# Patient Record
Sex: FEMALE | Race: NATIVE HAWAIIAN OR OTHER PACIFIC ISLANDER | URBAN - METROPOLITAN AREA
[De-identification: names, ages, dates, MRNs, and addresses within clinical notes are randomized per-mention and may not be internally consistent; named-entity substitution may affect disease eponyms.]

---

## 2024-09-20 ENCOUNTER — OFFICE VISIT (OUTPATIENT)
Dept: FAMILY MEDICINE CLINIC | Facility: CLINIC | Age: 44
End: 2024-09-20
Payer: COMMERCIAL

## 2024-09-20 VITALS
RESPIRATION RATE: 13 BRPM | OXYGEN SATURATION: 97 % | BODY MASS INDEX: 49.29 KG/M2 | WEIGHT: 278.2 LBS | HEART RATE: 95 BPM | HEIGHT: 63 IN | SYSTOLIC BLOOD PRESSURE: 164 MMHG | DIASTOLIC BLOOD PRESSURE: 110 MMHG | TEMPERATURE: 98.4 F

## 2024-09-20 DIAGNOSIS — G43.E09 CHRONIC MIGRAINE WITH AURA WITHOUT STATUS MIGRAINOSUS, NOT INTRACTABLE: ICD-10-CM

## 2024-09-20 DIAGNOSIS — M43.06 LUMBAR SPONDYLOLYSIS: ICD-10-CM

## 2024-09-20 DIAGNOSIS — M54.42 CHRONIC BILATERAL LOW BACK PAIN WITH LEFT-SIDED SCIATICA: ICD-10-CM

## 2024-09-20 DIAGNOSIS — I10 ESSENTIAL (PRIMARY) HYPERTENSION: ICD-10-CM

## 2024-09-20 DIAGNOSIS — E11.69 TYPE 2 DIABETES MELLITUS WITH OTHER SPECIFIED COMPLICATION, WITHOUT LONG-TERM CURRENT USE OF INSULIN (HCC): Primary | ICD-10-CM

## 2024-09-20 DIAGNOSIS — G89.29 CHRONIC BILATERAL LOW BACK PAIN WITH LEFT-SIDED SCIATICA: ICD-10-CM

## 2024-09-20 DIAGNOSIS — Z12.31 ENCOUNTER FOR SCREENING MAMMOGRAM FOR BREAST CANCER: ICD-10-CM

## 2024-09-20 LAB — HBA1C MFR BLD: 8.5 %

## 2024-09-20 PROCEDURE — 83036 HEMOGLOBIN GLYCOSYLATED A1C: CPT | Performed by: STUDENT IN AN ORGANIZED HEALTH CARE EDUCATION/TRAINING PROGRAM

## 2024-09-20 PROCEDURE — 99214 OFFICE O/P EST MOD 30 MIN: CPT | Performed by: STUDENT IN AN ORGANIZED HEALTH CARE EDUCATION/TRAINING PROGRAM

## 2024-09-20 PROCEDURE — 3077F SYST BP >= 140 MM HG: CPT | Performed by: STUDENT IN AN ORGANIZED HEALTH CARE EDUCATION/TRAINING PROGRAM

## 2024-09-20 PROCEDURE — 3080F DIAST BP >= 90 MM HG: CPT | Performed by: STUDENT IN AN ORGANIZED HEALTH CARE EDUCATION/TRAINING PROGRAM

## 2024-09-20 RX ORDER — LOSARTAN POTASSIUM AND HYDROCHLOROTHIAZIDE 12.5; 1 MG/1; MG/1
1 TABLET ORAL DAILY
Qty: 90 TABLET | Refills: 3 | Status: SHIPPED | OUTPATIENT
Start: 2024-09-20

## 2024-09-20 RX ORDER — LOSARTAN POTASSIUM 100 MG/1
100 TABLET ORAL DAILY
Qty: 90 TABLET | Refills: 1 | Status: SHIPPED | OUTPATIENT
Start: 2024-09-20 | End: 2024-09-20 | Stop reason: ALTCHOICE

## 2024-09-20 RX ORDER — GABAPENTIN 600 MG/1
600 TABLET ORAL 2 TIMES DAILY
Qty: 180 TABLET | Refills: 1 | Status: SHIPPED | OUTPATIENT
Start: 2024-09-20 | End: 2025-03-19

## 2024-09-20 RX ORDER — LOSARTAN POTASSIUM AND HYDROCHLOROTHIAZIDE 12.5; 1 MG/1; MG/1
1 TABLET ORAL DAILY
Qty: 30 TABLET | Refills: 0 | Status: SHIPPED | OUTPATIENT
Start: 2024-09-20 | End: 2024-09-20 | Stop reason: SDUPTHER

## 2024-09-20 RX ORDER — TOPIRAMATE 50 MG/1
50 TABLET, FILM COATED ORAL 2 TIMES DAILY
Qty: 180 TABLET | Refills: 3 | Status: SHIPPED | OUTPATIENT
Start: 2024-09-20

## 2024-09-20 RX ORDER — GLIPIZIDE 5 MG/1
5 TABLET ORAL
COMMUNITY
Start: 2024-08-22 | End: 2024-09-20 | Stop reason: ALTCHOICE

## 2024-09-20 SDOH — ECONOMIC STABILITY: INCOME INSECURITY: HOW HARD IS IT FOR YOU TO PAY FOR THE VERY BASICS LIKE FOOD, HOUSING, MEDICAL CARE, AND HEATING?: NOT HARD AT ALL

## 2024-09-20 SDOH — ECONOMIC STABILITY: FOOD INSECURITY: WITHIN THE PAST 12 MONTHS, YOU WORRIED THAT YOUR FOOD WOULD RUN OUT BEFORE YOU GOT MONEY TO BUY MORE.: NEVER TRUE

## 2024-09-20 SDOH — ECONOMIC STABILITY: FOOD INSECURITY: WITHIN THE PAST 12 MONTHS, THE FOOD YOU BOUGHT JUST DIDN'T LAST AND YOU DIDN'T HAVE MONEY TO GET MORE.: NEVER TRUE

## 2024-09-20 ASSESSMENT — PATIENT HEALTH QUESTIONNAIRE - PHQ9
SUM OF ALL RESPONSES TO PHQ QUESTIONS 1-9: 0
SUM OF ALL RESPONSES TO PHQ QUESTIONS 1-9: 0
2. FEELING DOWN, DEPRESSED OR HOPELESS: NOT AT ALL
SUM OF ALL RESPONSES TO PHQ QUESTIONS 1-9: 0
SUM OF ALL RESPONSES TO PHQ9 QUESTIONS 1 & 2: 0
SUM OF ALL RESPONSES TO PHQ QUESTIONS 1-9: 0
1. LITTLE INTEREST OR PLEASURE IN DOING THINGS: NOT AT ALL

## 2024-09-20 ASSESSMENT — ENCOUNTER SYMPTOMS
RHINORRHEA: 0
NAUSEA: 0
ABDOMINAL PAIN: 0
SORE THROAT: 0
EYE PAIN: 0
DIARRHEA: 0
CONSTIPATION: 0
VOMITING: 0
CHEST TIGHTNESS: 0
BACK PAIN: 1
COUGH: 0
SHORTNESS OF BREATH: 0

## 2024-09-21 LAB
A/G RATIO: 1.1 RATIO (ref 1.1–2.6)
ALBUMIN: 2.9 G/DL (ref 3.5–5)
ALP BLD-CCNC: 105 U/L (ref 25–115)
ALT SERPL-CCNC: 8 U/L (ref 5–40)
ANION GAP SERPL CALCULATED.3IONS-SCNC: 8 MMOL/L (ref 3–15)
AST SERPL-CCNC: 10 U/L (ref 10–37)
BASOPHILS ABSOLUTE: 0 K/UL (ref 0–0.2)
BASOPHILS RELATIVE PERCENT: 0 % (ref 0–2)
BILIRUB SERPL-MCNC: 0.3 MG/DL (ref 0.2–1.2)
BUN BLDV-MCNC: 12 MG/DL (ref 6–22)
CALCIUM SERPL-MCNC: 8.4 MG/DL (ref 8.4–10.5)
CHLORIDE BLD-SCNC: 110 MMOL/L (ref 98–110)
CHOLESTEROL, TOTAL: 166 MG/DL (ref 110–200)
CHOLESTEROL/HDL RATIO: 2.9 (ref 0–5)
CO2: 28 MMOL/L (ref 20–32)
CREAT SERPL-MCNC: 1.2 MG/DL (ref 0.5–1.2)
CREATININE URINE: 38 MG/DL
CREATININE URINE: 38 MG/DL
EOSINOPHIL # BLD: 2 % (ref 0–6)
EOSINOPHILS ABSOLUTE: 0.2 K/UL (ref 0–0.5)
GFR, ESTIMATED: 55.1 ML/MIN/1.73 SQ.M.
GLOBULIN: 2.7 G/DL (ref 2–4)
GLUCOSE: 146 MG/DL (ref 70–99)
HCT VFR BLD CALC: 29.8 % (ref 35.1–46.5)
HDLC SERPL-MCNC: 57 MG/DL
HEMOGLOBIN: 8.8 G/DL (ref 11.7–15.5)
LDL CHOLESTEROL: 88 MG/DL (ref 50–99)
LDL/HDL RATIO: 1.5
LYMPHOCYTES # BLD: 18 % (ref 20–45)
LYMPHOCYTES ABSOLUTE: 1.7 K/UL (ref 1–4.8)
MCH RBC QN AUTO: 25 PG (ref 26–34)
MCHC RBC AUTO-ENTMCNC: 30 G/DL (ref 31–36)
MCV RBC AUTO: 85 FL (ref 80–99)
MICROALB/CREAT RATIO (UG/MG CREAT.): 8341.6 (ref 0–30)
MICROALBUMIN/CREAT 24H UR: 3169.8 MG/L (ref 0.1–17)
MONOCYTES ABSOLUTE: 0.6 K/UL (ref 0.1–1)
MONOCYTES: 6 % (ref 3–12)
NEUTROPHILS ABSOLUTE: 7 K/UL (ref 1.8–7.7)
NEUTROPHILS: 73 % (ref 40–75)
NON-HDL CHOLESTEROL: 109 MG/DL
PDW BLD-RTO: 14.9 % (ref 10–15.5)
PLATELET # BLD: 239 K/UL (ref 140–440)
PMV BLD AUTO: 10.4 FL (ref 9–13)
POTASSIUM SERPL-SCNC: 3.9 MMOL/L (ref 3.5–5.5)
RBC # BLD: 3.52 M/UL (ref 3.8–5.2)
SODIUM BLD-SCNC: 146 MMOL/L (ref 133–145)
TOTAL PROTEIN: 5.6 G/DL (ref 6.4–8.3)
TRIGL SERPL-MCNC: 105 MG/DL (ref 40–149)
VLDLC SERPL CALC-MCNC: 21 MG/DL (ref 8–30)
WBC # BLD: 9.5 K/UL (ref 4–11)

## 2024-09-24 RX ORDER — DAPAGLIFLOZIN 10 MG/1
10 TABLET, FILM COATED ORAL DAILY
Qty: 30 TABLET | OUTPATIENT
Start: 2024-09-24

## 2024-09-24 NOTE — TELEPHONE ENCOUNTER
Pt called requesting a refill to be sent to the [facility] on file.    Requested Prescriptions     Pending Prescriptions Disp Refills    dapagliflozin (FARXIGA) 10 MG tablet 30 tablet      Sig: Take 1 tablet by mouth daily

## 2024-10-16 ENCOUNTER — HOSPITAL ENCOUNTER (OUTPATIENT)
Facility: HOSPITAL | Age: 44
Setting detail: SPECIMEN
Discharge: HOME | End: 2024-10-19

## 2024-10-16 ENCOUNTER — OFFICE VISIT (OUTPATIENT)
Dept: FAMILY MEDICINE CLINIC | Facility: CLINIC | Age: 44
End: 2024-10-16
Payer: COMMERCIAL

## 2024-10-16 VITALS
HEART RATE: 84 BPM | TEMPERATURE: 98 F | SYSTOLIC BLOOD PRESSURE: 138 MMHG | OXYGEN SATURATION: 99 % | RESPIRATION RATE: 14 BRPM | DIASTOLIC BLOOD PRESSURE: 84 MMHG | HEIGHT: 63 IN | WEIGHT: 255 LBS | BODY MASS INDEX: 45.18 KG/M2

## 2024-10-16 DIAGNOSIS — N28.9 ABNORMAL KIDNEY FUNCTION: ICD-10-CM

## 2024-10-16 DIAGNOSIS — E11.22 TYPE 2 DIABETES MELLITUS WITH STAGE 3A CHRONIC KIDNEY DISEASE, WITHOUT LONG-TERM CURRENT USE OF INSULIN (HCC): ICD-10-CM

## 2024-10-16 DIAGNOSIS — N91.2 AMENORRHEA: Primary | ICD-10-CM

## 2024-10-16 DIAGNOSIS — Z12.11 ENCOUNTER FOR SCREENING FOR MALIGNANT NEOPLASM OF COLON: ICD-10-CM

## 2024-10-16 DIAGNOSIS — R53.83 OTHER FATIGUE: ICD-10-CM

## 2024-10-16 DIAGNOSIS — I10 PRIMARY HYPERTENSION: ICD-10-CM

## 2024-10-16 DIAGNOSIS — N18.31 TYPE 2 DIABETES MELLITUS WITH STAGE 3A CHRONIC KIDNEY DISEASE, WITHOUT LONG-TERM CURRENT USE OF INSULIN (HCC): ICD-10-CM

## 2024-10-16 DIAGNOSIS — D64.9 ANEMIA, UNSPECIFIED TYPE: ICD-10-CM

## 2024-10-16 LAB
HCG QUALITATIVE, POC: NORMAL
HCG, PREGNANCY, URINE, POC: NEGATIVE
SENTARA SPECIMEN COLLECTION: NORMAL
VALID INTERNAL CONTROL, POC: YES

## 2024-10-16 PROCEDURE — 99001 SPECIMEN HANDLING PT-LAB: CPT

## 2024-10-16 PROCEDURE — 3075F SYST BP GE 130 - 139MM HG: CPT | Performed by: STUDENT IN AN ORGANIZED HEALTH CARE EDUCATION/TRAINING PROGRAM

## 2024-10-16 PROCEDURE — 84703 CHORIONIC GONADOTROPIN ASSAY: CPT | Performed by: STUDENT IN AN ORGANIZED HEALTH CARE EDUCATION/TRAINING PROGRAM

## 2024-10-16 PROCEDURE — 99214 OFFICE O/P EST MOD 30 MIN: CPT | Performed by: STUDENT IN AN ORGANIZED HEALTH CARE EDUCATION/TRAINING PROGRAM

## 2024-10-16 PROCEDURE — 3079F DIAST BP 80-89 MM HG: CPT | Performed by: STUDENT IN AN ORGANIZED HEALTH CARE EDUCATION/TRAINING PROGRAM

## 2024-10-16 RX ORDER — BUPROPION HYDROCHLORIDE 150 MG/1
150 TABLET ORAL EVERY MORNING
Qty: 30 TABLET | Refills: 3 | Status: SHIPPED | OUTPATIENT
Start: 2024-10-16

## 2024-10-16 RX ORDER — DAPAGLIFLOZIN 10 MG/1
10 TABLET, FILM COATED ORAL EVERY MORNING
Qty: 90 TABLET | Refills: 3 | Status: SHIPPED | OUTPATIENT
Start: 2024-10-16

## 2024-10-16 RX ORDER — FERROUS SULFATE 325(65) MG
325 TABLET ORAL
COMMUNITY

## 2024-10-16 ASSESSMENT — ENCOUNTER SYMPTOMS
SORE THROAT: 0
NAUSEA: 0
EYE PAIN: 0
COUGH: 0
CHEST TIGHTNESS: 0
VOMITING: 0
BACK PAIN: 0
DIARRHEA: 0
RHINORRHEA: 0
CONSTIPATION: 0
ABDOMINAL PAIN: 0
SHORTNESS OF BREATH: 0

## 2024-10-16 NOTE — PROGRESS NOTES
Mimi Langford is a 44 y.o. female (: 1980) presenting to address:    Chief Complaint   Patient presents with    Dysmenorrhea     Feeling fatigued, at home pregnancy test negative in August       Vitals:    10/16/24 1116   BP: (!) 152/84   Pulse: 84   Resp: 14   Temp: 98 °F (36.7 °C)   SpO2: 99%       \"Have you been to the ER, urgent care clinic since your last visit?  Hospitalized since your last visit?\"    NO    “Have you seen or consulted any other health care providers outside of [facility] since your last visit?”    NO    “Have you had a colorectal cancer screening such as a colonoscopy/FIT/Cologuard?    NO    No colonoscopy on file  No cologuard on file  No FIT/FOBT on file   No flexible sigmoidoscopy on file         “Have you had a pap smear?”    NO    Date of last Cervical Cancer screen (HPV or PAP): 7/15/2014     Pt requests flu vaccine.      
lab workup will be conducted to assess her iron levels, FSH, and other hormone levels. A referral for a sleep study will be made. Wellbutrin will be prescribed to be taken once in the morning. If her iron levels remain low, an infusion of iron will be considered.    2. Anemia.  She reports dizziness and fainting spells, which could be related to her anemia. She has been taking over-the-counter iron pills as advised by her niece. Her iron levels will be checked today. If her iron levels remain low, an infusion of iron will be considered.    3. Decreased Kidney Function.  Her kidney function is slightly decreased, which could be contributing to her fatigue and anemia. This is likely due to her elevated blood sugar and blood pressure. Farxiga will be initiated to help manage her kidney function and blood sugar levels.    4. Hypertension.  Her blood pressure remains slightly elevated at 150/80 today. She has been monitoring her blood pressure at home, which has been in the 120s to 130s. She will continue her current blood pressure medication regimen.    5. Health Maintenance.  A colonoscopy is recommended due to her anemia and for colon cancer screening. A referral will be made for this procedure.    Follow-up  Return in 1 month for follow up.    Plan of care has been discussed, patient agrees with plan; all questions answered.   More than 50% of the time spent in this visit was counseling the patient about  illness and treatment options       Efrain Piedra, DO  Family Medicine  [facility]       PLEASE NOTE:   This document has been produced using voice recognition software. Unrecognized errors in transcription may be present.

## 2024-10-17 DIAGNOSIS — N17.9 ACUTE KIDNEY INJURY (HCC): Primary | ICD-10-CM

## 2024-10-17 LAB
ANION GAP SERPL CALCULATED.3IONS-SCNC: 10 MMOL/L (ref 3–15)
BASOPHILS ABSOLUTE: 0.1 K/UL (ref 0–0.2)
BASOPHILS RELATIVE PERCENT: 1 % (ref 0–2)
BUN BLDV-MCNC: 25 MG/DL (ref 6–22)
CALCIUM SERPL-MCNC: 8.9 MG/DL (ref 8.4–10.5)
CHLORIDE BLD-SCNC: 111 MMOL/L (ref 98–110)
CO2: 22 MMOL/L (ref 20–32)
CREAT SERPL-MCNC: 1.6 MG/DL (ref 0.5–1.2)
EOSINOPHIL # BLD: 3 % (ref 0–6)
EOSINOPHILS ABSOLUTE: 0.3 K/UL (ref 0–0.5)
FERRITIN: 108 NG/ML (ref 10–291)
FOLATE: 12 NG/ML
FOLLICLE STIMULATING HORMONE: 7.2 MIU/ML
GFR, ESTIMATED: 39.5 ML/MIN/1.73 SQ.M.
GLUCOSE: 104 MG/DL (ref 70–99)
HCT VFR BLD CALC: 34.5 % (ref 35.1–48)
HEMOGLOBIN: 10 G/DL (ref 11.7–16)
IRON % SATURATION: 16 % (ref 20–50)
IRON: 37 MCG/DL (ref 30–160)
LUTEINIZING HORMONE: 14 MIU/ML
LYMPHOCYTES # BLD: 22 % (ref 20–45)
LYMPHOCYTES ABSOLUTE: 1.6 K/UL (ref 1–4.8)
MCH RBC QN AUTO: 26 PG (ref 26–34)
MCHC RBC AUTO-ENTMCNC: 29 G/DL (ref 31–36)
MCV RBC AUTO: 91 FL (ref 80–99)
MONOCYTES ABSOLUTE: 0.3 K/UL (ref 0.1–1)
MONOCYTES: 5 % (ref 3–12)
NEUTROPHILS ABSOLUTE: 5.3 K/UL (ref 1.8–7.7)
NEUTROPHILS: 70 % (ref 40–75)
PDW BLD-RTO: 15.2 % (ref 10–15.5)
PLATELET # BLD: 259 K/UL (ref 140–440)
PMV BLD AUTO: 12.1 FL (ref 9–13)
POTASSIUM SERPL-SCNC: 5.6 MMOL/L (ref 3.5–5.5)
RBC # BLD: 3.8 M/UL (ref 3.8–5.2)
SODIUM BLD-SCNC: 143 MMOL/L (ref 133–145)
TOTAL IRON BINDING CAPACITY: 231 MCG/DL (ref 228–428)
TSH SERPL DL<=0.05 MIU/L-ACNC: 1.48 MCU/ML (ref 0.27–4.2)
UIBC: 194 MCG/DL (ref 110–370)
VITAMIN B-12: 322 PG/ML (ref 211–911)
WBC # BLD: 7.6 K/UL (ref 4–11)

## 2024-10-18 ENCOUNTER — TELEPHONE (OUTPATIENT)
Dept: FAMILY MEDICINE CLINIC | Facility: CLINIC | Age: 44
End: 2024-10-18

## 2024-10-18 NOTE — TELEPHONE ENCOUNTER
Discussed lab results w/pt on 10/17/24; pt called today for clarification of results; spoke w/PCP and questions were answered.

## 2024-10-18 NOTE — TELEPHONE ENCOUNTER
Pt called and wanted to discuss lab results.  Pt is wanting to discuss taking the ozempic and if that is resulting in her kidney lab readings.  Pt also stated if she is at work, there is no problem leaving a voicemail.

## 2024-10-23 ENCOUNTER — HOSPITAL ENCOUNTER (OUTPATIENT)
Facility: HOSPITAL | Age: 44
Setting detail: SPECIMEN
Discharge: HOME | End: 2024-10-26

## 2024-10-23 PROCEDURE — 99001 SPECIMEN HANDLING PT-LAB: CPT

## 2024-10-24 LAB
ANION GAP SERPL CALCULATED.3IONS-SCNC: 11 MMOL/L (ref 3–15)
BUN BLDV-MCNC: 22 MG/DL (ref 6–22)
CALCIUM SERPL-MCNC: 9.4 MG/DL (ref 8.4–10.5)
CHLORIDE BLD-SCNC: 108 MMOL/L (ref 98–110)
CO2: 23 MMOL/L (ref 20–32)
CREAT SERPL-MCNC: 1.9 MG/DL (ref 0.5–1.2)
GFR, ESTIMATED: 32.4 ML/MIN/1.73 SQ.M.
GLUCOSE: 125 MG/DL (ref 70–99)
POTASSIUM SERPL-SCNC: 5.2 MMOL/L (ref 3.5–5.5)
SENTARA SPECIMEN COLLECTION: NORMAL
SODIUM BLD-SCNC: 142 MMOL/L (ref 133–145)

## 2024-10-25 DIAGNOSIS — N17.9 AKI (ACUTE KIDNEY INJURY) (HCC): ICD-10-CM

## 2024-10-25 DIAGNOSIS — I10 PRIMARY HYPERTENSION: Primary | ICD-10-CM

## 2024-10-25 RX ORDER — AMLODIPINE BESYLATE 10 MG/1
10 TABLET ORAL DAILY
Qty: 90 TABLET | Refills: 0 | Status: SHIPPED | OUTPATIENT
Start: 2024-10-25

## 2024-11-13 ENCOUNTER — HOSPITAL ENCOUNTER (OUTPATIENT)
Facility: HOSPITAL | Age: 44
Setting detail: SPECIMEN
Discharge: HOME | End: 2024-11-16

## 2024-11-13 ENCOUNTER — OFFICE VISIT (OUTPATIENT)
Dept: FAMILY MEDICINE CLINIC | Facility: CLINIC | Age: 44
End: 2024-11-13
Payer: COMMERCIAL

## 2024-11-13 VITALS
RESPIRATION RATE: 13 BRPM | SYSTOLIC BLOOD PRESSURE: 140 MMHG | TEMPERATURE: 98.4 F | BODY MASS INDEX: 44.58 KG/M2 | HEART RATE: 90 BPM | HEIGHT: 63 IN | DIASTOLIC BLOOD PRESSURE: 80 MMHG | WEIGHT: 251.6 LBS | OXYGEN SATURATION: 99 %

## 2024-11-13 DIAGNOSIS — N18.31 TYPE 2 DIABETES MELLITUS WITH STAGE 3A CHRONIC KIDNEY DISEASE, WITHOUT LONG-TERM CURRENT USE OF INSULIN (HCC): Primary | ICD-10-CM

## 2024-11-13 DIAGNOSIS — N17.9 AKI (ACUTE KIDNEY INJURY) (HCC): ICD-10-CM

## 2024-11-13 DIAGNOSIS — I10 PRIMARY HYPERTENSION: ICD-10-CM

## 2024-11-13 DIAGNOSIS — E11.22 TYPE 2 DIABETES MELLITUS WITH STAGE 3A CHRONIC KIDNEY DISEASE, WITHOUT LONG-TERM CURRENT USE OF INSULIN (HCC): Primary | ICD-10-CM

## 2024-11-13 DIAGNOSIS — R53.83 OTHER FATIGUE: ICD-10-CM

## 2024-11-13 LAB — SENTARA SPECIMEN COLLECTION: NORMAL

## 2024-11-13 PROCEDURE — 99214 OFFICE O/P EST MOD 30 MIN: CPT | Performed by: STUDENT IN AN ORGANIZED HEALTH CARE EDUCATION/TRAINING PROGRAM

## 2024-11-13 PROCEDURE — 99001 SPECIMEN HANDLING PT-LAB: CPT

## 2024-11-13 PROCEDURE — 3079F DIAST BP 80-89 MM HG: CPT | Performed by: STUDENT IN AN ORGANIZED HEALTH CARE EDUCATION/TRAINING PROGRAM

## 2024-11-13 PROCEDURE — 3077F SYST BP >= 140 MM HG: CPT | Performed by: STUDENT IN AN ORGANIZED HEALTH CARE EDUCATION/TRAINING PROGRAM

## 2024-11-13 RX ORDER — HYDRALAZINE HYDROCHLORIDE 25 MG/1
25 TABLET, FILM COATED ORAL 2 TIMES DAILY
Qty: 60 TABLET | Refills: 3 | Status: SHIPPED | OUTPATIENT
Start: 2024-11-13

## 2024-11-13 RX ORDER — BUPROPION HYDROCHLORIDE 150 MG/1
150 TABLET ORAL EVERY MORNING
Qty: 90 TABLET | Refills: 3 | Status: SHIPPED | OUTPATIENT
Start: 2024-11-13

## 2024-11-13 ASSESSMENT — ENCOUNTER SYMPTOMS
ABDOMINAL PAIN: 0
BACK PAIN: 0
RHINORRHEA: 0
SORE THROAT: 0
EYE PAIN: 0
VOMITING: 0
SHORTNESS OF BREATH: 0
DIARRHEA: 0
CHEST TIGHTNESS: 0
COUGH: 0
NAUSEA: 0
CONSTIPATION: 0

## 2024-11-13 NOTE — PROGRESS NOTES
[facility]      MR#: [id number]    HISTORY OF PRESENT ILLNESS  History of Present Illness  The patient is a 44-year-old female who presents for follow-up for her kidney and blood pressure.    She reports feeling generally well but experienced foot swelling two days ago, which she managed by elevating her feet. She has discontinued several medications and has been monitoring her blood pressure at home, which was slightly elevated last night. She recalls taking ibuprofen when her kidneys were functioning normally. She reports no urinary tract infections or pain during urination but notes occasional bubbly urine. She is not currently taking gabapentin due to concerns about potential kidney damage.    She is responding well to Wellbutrin, reporting improved energy levels and mood. She wishes to continue this medication.    She experiences pain in her spine area, which she attributes to a previous accident. She also has knee pain, having undergone knee surgery in the past. She reports no mental, emotional, or sensory issues.    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

## 2024-11-13 NOTE — PROGRESS NOTES
Mimi Langford is a 44 y.o. female (: 1980) presenting to address:    Chief Complaint   Patient presents with    Follow-up     Discuss results; bilat feet swelling x 2 days       Vitals:    24 0817   BP: (!) 164/80   Pulse: 90   Resp: 13   Temp: 98.4 °F (36.9 °C)   SpO2: 99%       \"Have you been to the ER, urgent care clinic since your last visit?  Hospitalized since your last visit?\"    NO    “Have you seen or consulted any other health care providers outside of [facility] since your last visit?”    YES - When: approximately 1 months ago.  Where and Why: Gastro.    “Have you had a colorectal cancer screening such as a colonoscopy/FIT/Cologuard?    NO; pt scheduled 24 at [facility]    No colonoscopy on file  No cologuard on file  No FIT/FOBT on file   No flexible sigmoidoscopy on file         “Have you had a pap smear?”    NO    Date of last Cervical Cancer screen (HPV or PAP): 7/15/2014       Pt requests flu vaccine.      Immunizations Administered       Name Date Dose Route    Influenza, FLUCELVAX, (age 6 mo+) IM, Trivalent PF, 0.5mL 2024 0.5 mL Intramuscular    Site: Deltoid- Left    Lot: [id number]    NDC: 70290-011-95

## 2024-11-15 ENCOUNTER — TELEPHONE (OUTPATIENT)
Dept: FAMILY MEDICINE CLINIC | Facility: CLINIC | Age: 44
End: 2024-11-15

## 2024-11-15 LAB
ANION GAP SERPL CALCULATED.3IONS-SCNC: 10 MMOL/L (ref 3–15)
BACTERIA: PRESENT
BILIRUB SERPL-MCNC: NEGATIVE MG/DL
BLOOD: ABNORMAL
BUN BLDV-MCNC: 12 MG/DL (ref 6–22)
CALCIUM SERPL-MCNC: 9 MG/DL (ref 8.4–10.5)
CHLORIDE BLD-SCNC: 109 MMOL/L (ref 98–110)
CLARITY, UA: ABNORMAL
CO2: 25 MMOL/L (ref 20–32)
COLOR, UA: YELLOW
CREAT SERPL-MCNC: 1.1 MG/DL (ref 0.5–1.2)
CREATININE URINE: 36 MG/DL
EPITHELIAL CELLS: ABNORMAL /HPF
GFR, ESTIMATED: >60 ML/MIN/1.73 SQ.M.
GLUCOSE: 115 MG/DL (ref 70–99)
GLUCOSE: ABNORMAL MG/DL
HYALINE CASTS: ABNORMAL /LPF (ref 0–2)
KETONES, URINE: NEGATIVE MG/DL
LEUKOCYTE ESTERASE, URINE: NEGATIVE
MICROALB/CREAT RATIO (UG/MG CREAT.): 4062.8 (ref 0–30)
MICROALBUMIN/CREAT 24H UR: 1462.6 MG/L (ref 0.1–17)
NITRITE, URINE: NEGATIVE
PH, URINE: 5.5 PH (ref 5–8)
POTASSIUM SERPL-SCNC: 4.1 MMOL/L (ref 3.5–5.5)
PROTEIN UA: ABNORMAL MG/DL
RBC URINE: ABNORMAL /HPF
SODIUM BLD-SCNC: 144 MMOL/L (ref 133–145)
SPECIFIC GRAVITY UA: 1.01 (ref 1–1.03)
UROBILINOGEN, URINE: 0.2 MG/DL
WBC UA: ABNORMAL /HPF (ref 0–5)

## 2024-11-15 NOTE — TELEPHONE ENCOUNTER
Informed pt of lab results; does pt need a lab appt in one month or both lab appt and office visit?

## 2024-11-20 ENCOUNTER — TELEPHONE (OUTPATIENT)
Dept: FAMILY MEDICINE CLINIC | Facility: CLINIC | Age: 44
End: 2024-11-20

## 2024-12-13 ENCOUNTER — TELEPHONE (OUTPATIENT)
Dept: FAMILY MEDICINE CLINIC | Facility: CLINIC | Age: 44
End: 2024-12-13

## 2024-12-13 DIAGNOSIS — E11.22 TYPE 2 DIABETES MELLITUS WITH STAGE 3A CHRONIC KIDNEY DISEASE, WITHOUT LONG-TERM CURRENT USE OF INSULIN (HCC): Primary | ICD-10-CM

## 2024-12-13 DIAGNOSIS — N18.31 TYPE 2 DIABETES MELLITUS WITH STAGE 3A CHRONIC KIDNEY DISEASE, WITHOUT LONG-TERM CURRENT USE OF INSULIN (HCC): Primary | ICD-10-CM

## 2024-12-13 DIAGNOSIS — N17.9 AKI (ACUTE KIDNEY INJURY) (HCC): ICD-10-CM

## 2024-12-13 NOTE — TELEPHONE ENCOUNTER
Pt is requesting labs to be placed. If labs are needed please place orders patient will be called to be scheduled

## 2025-01-08 ENCOUNTER — HOSPITAL ENCOUNTER (OUTPATIENT)
Facility: HOSPITAL | Age: 45
Setting detail: SPECIMEN
Discharge: HOME | End: 2025-01-11

## 2025-01-08 ENCOUNTER — OFFICE VISIT (OUTPATIENT)
Dept: FAMILY MEDICINE CLINIC | Facility: CLINIC | Age: 45
End: 2025-01-08
Payer: COMMERCIAL

## 2025-01-08 VITALS
OXYGEN SATURATION: 100 % | TEMPERATURE: 97.5 F | HEIGHT: 63 IN | RESPIRATION RATE: 13 BRPM | SYSTOLIC BLOOD PRESSURE: 130 MMHG | BODY MASS INDEX: 42.56 KG/M2 | DIASTOLIC BLOOD PRESSURE: 84 MMHG | HEART RATE: 81 BPM | WEIGHT: 240.2 LBS

## 2025-01-08 DIAGNOSIS — E11.29 TYPE 2 DIABETES MELLITUS WITH OTHER DIABETIC KIDNEY COMPLICATION, WITHOUT LONG-TERM CURRENT USE OF INSULIN (HCC): Primary | ICD-10-CM

## 2025-01-08 DIAGNOSIS — I10 PRIMARY HYPERTENSION: ICD-10-CM

## 2025-01-08 DIAGNOSIS — K90.0 CELIAC DISEASE: ICD-10-CM

## 2025-01-08 LAB
HBA1C MFR BLD: 6.8 %
SENTARA SPECIMEN COLLECTION: NORMAL

## 2025-01-08 PROCEDURE — 99214 OFFICE O/P EST MOD 30 MIN: CPT | Performed by: STUDENT IN AN ORGANIZED HEALTH CARE EDUCATION/TRAINING PROGRAM

## 2025-01-08 PROCEDURE — 83036 HEMOGLOBIN GLYCOSYLATED A1C: CPT | Performed by: STUDENT IN AN ORGANIZED HEALTH CARE EDUCATION/TRAINING PROGRAM

## 2025-01-08 PROCEDURE — 3075F SYST BP GE 130 - 139MM HG: CPT | Performed by: STUDENT IN AN ORGANIZED HEALTH CARE EDUCATION/TRAINING PROGRAM

## 2025-01-08 PROCEDURE — 99001 SPECIMEN HANDLING PT-LAB: CPT

## 2025-01-08 PROCEDURE — 3079F DIAST BP 80-89 MM HG: CPT | Performed by: STUDENT IN AN ORGANIZED HEALTH CARE EDUCATION/TRAINING PROGRAM

## 2025-01-08 SDOH — ECONOMIC STABILITY: FOOD INSECURITY: WITHIN THE PAST 12 MONTHS, YOU WORRIED THAT YOUR FOOD WOULD RUN OUT BEFORE YOU GOT MONEY TO BUY MORE.: NEVER TRUE

## 2025-01-08 SDOH — ECONOMIC STABILITY: FOOD INSECURITY: WITHIN THE PAST 12 MONTHS, THE FOOD YOU BOUGHT JUST DIDN'T LAST AND YOU DIDN'T HAVE MONEY TO GET MORE.: NEVER TRUE

## 2025-01-08 ASSESSMENT — ENCOUNTER SYMPTOMS
CONSTIPATION: 0
EYE PAIN: 0
ABDOMINAL PAIN: 0
SORE THROAT: 0
VOMITING: 0
COUGH: 0
DIARRHEA: 0
SHORTNESS OF BREATH: 0
NAUSEA: 0
BACK PAIN: 0
RHINORRHEA: 0
CHEST TIGHTNESS: 0

## 2025-01-08 ASSESSMENT — PATIENT HEALTH QUESTIONNAIRE - PHQ9
SUM OF ALL RESPONSES TO PHQ QUESTIONS 1-9: 0
1. LITTLE INTEREST OR PLEASURE IN DOING THINGS: NOT AT ALL
SUM OF ALL RESPONSES TO PHQ QUESTIONS 1-9: 0
SUM OF ALL RESPONSES TO PHQ QUESTIONS 1-9: 0
2. FEELING DOWN, DEPRESSED OR HOPELESS: NOT AT ALL
SUM OF ALL RESPONSES TO PHQ9 QUESTIONS 1 & 2: 0
SUM OF ALL RESPONSES TO PHQ QUESTIONS 1-9: 0

## 2025-01-08 NOTE — PROGRESS NOTES
Mimi Langford is a 44 y.o. female (: 1980) presenting to address:    Chief Complaint   Patient presents with    Diabetes       Vitals:    25 1115   BP: 130/84   Pulse: 81   Resp: 13   Temp: 97.5 °F (36.4 °C)   SpO2: 100%       \"Have you been to the ER, urgent care clinic since your last visit?  Hospitalized since your last visit?\"    NO    “Have you seen or consulted any other health care providers outside of [facility] since your last visit?”    NO    “Have you had a colorectal cancer screening such as a colonoscopy/FIT/Cologuard?    YES - Type: Colonoscopy - Where: 2024 at [facility] Digestive Nurse/CMA to request most recent records if not in the chart     No colonoscopy on file  No cologuard on file  No FIT/FOBT on file   No flexible sigmoidoscopy on file         “Have you had a pap smear?”    NO    Date of last Cervical Cancer screen (HPV or PAP): 7/15/2014           
Affect: Mood normal.         Behavior: Behavior normal.         Thought Content: Thought content normal.         Judgment: Judgment normal.        Results  Laboratory Studies  A1c is 6.8. Iron studies were normal.     ASSESSMENT and PLAN    Mimi \"Estela\" was seen today for diabetes.    Diagnoses and all orders for this visit:    Type 2 diabetes mellitus with other diabetic kidney complication, without long-term current use of insulin (HCC)  -     AMB POC HEMOGLOBIN A1C  -     External Referral To Nutrition Services    Celiac disease  -     External Referral To Nutrition Services    Primary hypertension       Assessment & Plan  1. Diabetes Mellitus.  Her A1c level is currently at 6.8, which is an improvement from the previous reading of 8.5. Blood pressure readings are within the normal range. Kidney function has shown improvement and is now within the normal range. She will continue her current medication regimen, including Farxiga. The reintroduction of metformin or ozempic will be considered at a later date.    2. Celiac Disease.  She has been diagnosed with early-stage celiac disease and has started a gluten-free diet. She reports feeling better since making this dietary change. A referral to a nutritionist at [employer] will be provided to help manage her diet.    3. Fatigue.  She reports worsening fatigue despite taking iron supplements and Wellbutrin. A referral for a sleep study at [facility] will be made to further investigate the cause of her fatigue.    4. Health Maintenance.  She underwent a colonoscopy, which was normal. The next colonoscopy is scheduled for 10 years from the last one.    Follow-up  The patient will follow up in 3 months.      Plan of care has been discussed, patient agrees with plan; all questions answered.   More than 50% of the time spent in this visit was counseling the patient about  illness and treatment options       Efrain Piedra, DO  Family Medicine  [facility]

## 2025-01-09 DIAGNOSIS — E11.22 TYPE 2 DIABETES MELLITUS WITH STAGE 3A CHRONIC KIDNEY DISEASE, WITHOUT LONG-TERM CURRENT USE OF INSULIN (HCC): Primary | ICD-10-CM

## 2025-01-09 DIAGNOSIS — N18.31 TYPE 2 DIABETES MELLITUS WITH STAGE 3A CHRONIC KIDNEY DISEASE, WITHOUT LONG-TERM CURRENT USE OF INSULIN (HCC): Primary | ICD-10-CM

## 2025-01-09 LAB
A/G RATIO: 1.1 RATIO (ref 1.1–2.6)
ALBUMIN: 3.6 G/DL (ref 3.5–5)
ALP BLD-CCNC: 118 U/L (ref 25–115)
ALT SERPL-CCNC: 13 U/L (ref 5–40)
ANION GAP SERPL CALCULATED.3IONS-SCNC: 12 MMOL/L (ref 3–15)
AST SERPL-CCNC: 13 U/L (ref 10–37)
BILIRUB SERPL-MCNC: 0.2 MG/DL (ref 0.2–1.2)
BUN BLDV-MCNC: 27 MG/DL (ref 6–22)
CALCIUM SERPL-MCNC: 9.8 MG/DL (ref 8.4–10.5)
CHLORIDE BLD-SCNC: 106 MMOL/L (ref 98–110)
CO2: 24 MMOL/L (ref 20–32)
CREAT SERPL-MCNC: 1.6 MG/DL (ref 0.5–1.2)
ESTIMATED AVERAGE GLUCOSE: 146 MG/DL (ref 91–123)
GFR, ESTIMATED: 39.5 ML/MIN/1.73 SQ.M.
GLOBULIN: 3.2 G/DL (ref 2–4)
GLUCOSE: 135 MG/DL (ref 70–99)
HBA1C MFR BLD: 6.7 % (ref 4.8–5.6)
POTASSIUM SERPL-SCNC: 5.5 MMOL/L (ref 3.5–5.5)
SODIUM BLD-SCNC: 142 MMOL/L (ref 133–145)
TOTAL PROTEIN: 6.8 G/DL (ref 6.4–8.3)

## 2025-01-09 NOTE — PROGRESS NOTES
Patient initially evaluated in September with type 2 diabetes and hypertension, off all medications.  She was restarted on her metformin and started on semaglutide.  Restarted on her losartan and Topamax.  In September her GFR was 55, creatinine 1.2, microalbumin was over 3000.  In October, GFR dropped to 39.5, creatinine increased to 1.6.  Her metformin and losartan were stopped.  She was started on hydralazine and Farxiga.  An ultrasound of her kidneys were normal.  On follow-up yesterday, blood pressure was well-controlled, her repeat BMP returned to normal, her A1c was improved.,  Now her GFR has dropped down again to 39.5, creatinine 1.6.  Will refer to nephrology.      Efrain Piedra,   [facility]

## 2025-02-10 DIAGNOSIS — I10 PRIMARY HYPERTENSION: ICD-10-CM

## 2025-02-10 RX ORDER — AMLODIPINE BESYLATE 10 MG/1
10 TABLET ORAL DAILY
Qty: 90 TABLET | Refills: 0 | Status: SHIPPED | OUTPATIENT
Start: 2025-02-10

## 2025-04-02 DIAGNOSIS — I10 PRIMARY HYPERTENSION: ICD-10-CM

## 2025-04-02 RX ORDER — HYDRALAZINE HYDROCHLORIDE 25 MG/1
25 TABLET, FILM COATED ORAL 2 TIMES DAILY
Qty: 180 TABLET | Refills: 3 | Status: SHIPPED | OUTPATIENT
Start: 2025-04-02

## 2025-04-02 NOTE — TELEPHONE ENCOUNTER
Patient stated that she called the pharmacy for a refill. She was informed that she had to uri the office to have provider to send a refill to pharmacy. Patient stated that she only has 1 pill left for today. Patient is scheduled for future appt 4/21/25

## 2025-05-05 ENCOUNTER — TELEPHONE (OUTPATIENT)
Dept: FAMILY MEDICINE CLINIC | Facility: CLINIC | Age: 45
End: 2025-05-05

## 2025-05-05 NOTE — TELEPHONE ENCOUNTER
She was supposed to follow up with me for DM in 3 months which was in April, she can just make an appointment with me....

## 2025-05-05 NOTE — TELEPHONE ENCOUNTER
Pt came into office stated she need an order for her labs to check her kidneys, she went to the office they advise her she will need an order and that they didn't have any times available at the time

## 2025-05-19 ENCOUNTER — HOSPITAL ENCOUNTER (OUTPATIENT)
Facility: HOSPITAL | Age: 45
Setting detail: SPECIMEN
Discharge: HOME | End: 2025-05-22

## 2025-05-19 ENCOUNTER — OFFICE VISIT (OUTPATIENT)
Dept: FAMILY MEDICINE CLINIC | Facility: CLINIC | Age: 45
End: 2025-05-19
Payer: COMMERCIAL

## 2025-05-19 ENCOUNTER — LAB (OUTPATIENT)
Dept: FAMILY MEDICINE CLINIC | Facility: CLINIC | Age: 45
End: 2025-05-19

## 2025-05-19 VITALS
BODY MASS INDEX: 42.03 KG/M2 | OXYGEN SATURATION: 99 % | SYSTOLIC BLOOD PRESSURE: 132 MMHG | WEIGHT: 237.2 LBS | RESPIRATION RATE: 13 BRPM | DIASTOLIC BLOOD PRESSURE: 70 MMHG | HEIGHT: 63 IN | HEART RATE: 79 BPM | TEMPERATURE: 98.2 F

## 2025-05-19 DIAGNOSIS — K90.0 CELIAC DISEASE: ICD-10-CM

## 2025-05-19 DIAGNOSIS — D64.9 ANEMIA, UNSPECIFIED TYPE: ICD-10-CM

## 2025-05-19 DIAGNOSIS — E11.29 TYPE 2 DIABETES MELLITUS WITH OTHER DIABETIC KIDNEY COMPLICATION, WITHOUT LONG-TERM CURRENT USE OF INSULIN (HCC): Primary | ICD-10-CM

## 2025-05-19 DIAGNOSIS — E11.29 TYPE 2 DIABETES MELLITUS WITH OTHER DIABETIC KIDNEY COMPLICATION, WITHOUT LONG-TERM CURRENT USE OF INSULIN (HCC): ICD-10-CM

## 2025-05-19 DIAGNOSIS — N17.9 AKI (ACUTE KIDNEY INJURY): ICD-10-CM

## 2025-05-19 DIAGNOSIS — N18.31 TYPE 2 DIABETES MELLITUS WITH STAGE 3A CHRONIC KIDNEY DISEASE, WITHOUT LONG-TERM CURRENT USE OF INSULIN (HCC): ICD-10-CM

## 2025-05-19 DIAGNOSIS — R53.83 OTHER FATIGUE: ICD-10-CM

## 2025-05-19 DIAGNOSIS — E11.22 TYPE 2 DIABETES MELLITUS WITH STAGE 3A CHRONIC KIDNEY DISEASE, WITHOUT LONG-TERM CURRENT USE OF INSULIN (HCC): ICD-10-CM

## 2025-05-19 LAB — HBA1C MFR BLD: 6.5 %

## 2025-05-19 PROCEDURE — 3044F HG A1C LEVEL LT 7.0%: CPT | Performed by: STUDENT IN AN ORGANIZED HEALTH CARE EDUCATION/TRAINING PROGRAM

## 2025-05-19 PROCEDURE — 99214 OFFICE O/P EST MOD 30 MIN: CPT | Performed by: STUDENT IN AN ORGANIZED HEALTH CARE EDUCATION/TRAINING PROGRAM

## 2025-05-19 PROCEDURE — 3075F SYST BP GE 130 - 139MM HG: CPT | Performed by: STUDENT IN AN ORGANIZED HEALTH CARE EDUCATION/TRAINING PROGRAM

## 2025-05-19 PROCEDURE — 3078F DIAST BP <80 MM HG: CPT | Performed by: STUDENT IN AN ORGANIZED HEALTH CARE EDUCATION/TRAINING PROGRAM

## 2025-05-19 PROCEDURE — 83036 HEMOGLOBIN GLYCOSYLATED A1C: CPT | Performed by: STUDENT IN AN ORGANIZED HEALTH CARE EDUCATION/TRAINING PROGRAM

## 2025-05-19 PROCEDURE — 99001 SPECIMEN HANDLING PT-LAB: CPT

## 2025-05-19 RX ORDER — DAPAGLIFLOZIN 10 MG/1
10 TABLET, FILM COATED ORAL EVERY MORNING
Qty: 90 TABLET | Refills: 3 | Status: SHIPPED | OUTPATIENT
Start: 2025-05-19

## 2025-05-19 RX ORDER — BUPROPION HYDROCHLORIDE 300 MG/1
300 TABLET ORAL EVERY MORNING
Qty: 30 TABLET | Refills: 3 | Status: SHIPPED | OUTPATIENT
Start: 2025-05-19

## 2025-05-19 ASSESSMENT — ENCOUNTER SYMPTOMS
ABDOMINAL PAIN: 0
VOMITING: 0
CHEST TIGHTNESS: 0
SORE THROAT: 0
CONSTIPATION: 0
COUGH: 0
DIARRHEA: 0
BACK PAIN: 0
NAUSEA: 0
RHINORRHEA: 0
EYE PAIN: 0
SHORTNESS OF BREATH: 0

## 2025-05-19 NOTE — PROGRESS NOTES
Mimi Langford is a 44 y.o. female (: 1980) presenting to address:    Chief Complaint   Patient presents with    Diabetes       Vitals:    25 1407   BP: 132/70   Pulse: 79   Resp: 13   Temp: 98.2 °F (36.8 °C)   SpO2: 99%       \"Have you been to the ER, urgent care clinic since your last visit?  Hospitalized since your last visit?\"    NO    “Have you seen or consulted any other health care providers outside of [facility] since your last visit?”    YES - When: approximately 2  weeks ago.  Where and Why: GI.        “Have you had a pap smear?”    NO; scheduled 25 w/OB/GYN    Date of last Cervical Cancer screen (HPV or PAP): 7/15/2014           
Systems   Constitutional:  Negative for appetite change, chills, fatigue, fever and unexpected weight change.   HENT:  Negative for congestion, rhinorrhea and sore throat.    Eyes:  Negative for pain.   Respiratory:  Negative for cough, chest tightness and shortness of breath.    Cardiovascular:  Negative for chest pain, palpitations and leg swelling.   Gastrointestinal:  Negative for abdominal pain, constipation, diarrhea, nausea and vomiting.   Genitourinary:  Negative for dysuria and frequency.   Musculoskeletal:  Negative for arthralgias, back pain and myalgias.   Skin:  Negative for rash and wound.   Neurological:  Negative for dizziness and weakness.     /70 (BP Site: Left Upper Arm, Patient Position: Sitting, BP Cuff Size: Large Adult)   Pulse 79   Temp 98.2 °F (36.8 °C) (Temporal)   Resp 13   Ht 1.6 m (5' 3\")   Wt 107.6 kg (237 lb 3.2 oz)   LMP 04/25/2025 (Exact Date)   SpO2 99%   Breastfeeding No   BMI 42.02 kg/m²     Physical Exam  Constitutional:       General: She is not in acute distress.     Appearance: Normal appearance. She is not ill-appearing or toxic-appearing.   HENT:      Head: Normocephalic and atraumatic.      Mouth/Throat:      Pharynx: No posterior oropharyngeal erythema.   Cardiovascular:      Rate and Rhythm: Normal rate and regular rhythm.      Pulses: Normal pulses.      Heart sounds: Normal heart sounds. No murmur heard.     No friction rub. No gallop.   Pulmonary:      Effort: Pulmonary effort is normal. No respiratory distress.      Breath sounds: Normal breath sounds. No wheezing, rhonchi or rales.   Abdominal:      General: Abdomen is flat. Bowel sounds are normal.      Palpations: Abdomen is soft.      Tenderness: There is no abdominal tenderness. There is no guarding.   Musculoskeletal:         General: Normal range of motion.      Cervical back: No tenderness.   Skin:     General: Skin is warm.      Capillary Refill: Capillary refill takes less than 2 seconds.

## 2025-05-20 LAB
A/G RATIO: 1.1 RATIO (ref 1.1–2.6)
ALBUMIN: 3.5 G/DL (ref 3.5–5)
ALP BLD-CCNC: 108 U/L (ref 25–115)
ALT SERPL-CCNC: 11 U/L (ref 5–40)
ANION GAP SERPL CALCULATED.3IONS-SCNC: 8 MMOL/L (ref 3–15)
AST SERPL-CCNC: 13 U/L (ref 10–37)
BASOPHILS # BLD: 1 % (ref 0–2)
BASOPHILS ABSOLUTE: 0.1 K/UL (ref 0–0.2)
BILIRUB SERPL-MCNC: 0.1 MG/DL (ref 0.2–1.2)
BUN BLDV-MCNC: 17 MG/DL (ref 6–22)
CALCIUM SERPL-MCNC: 8.9 MG/DL (ref 8.4–10.5)
CHLORIDE BLD-SCNC: 108 MMOL/L (ref 98–110)
CO2: 27 MMOL/L (ref 20–32)
CREAT SERPL-MCNC: 1.3 MG/DL (ref 0.5–1.2)
CREATININE, URINE  MG/DL: 75 MG/DL
EOSINOPHIL # BLD: 4 % (ref 0–6)
EOSINOPHILS ABSOLUTE: 0.4 K/UL (ref 0–0.5)
FERRITIN: 42 NG/ML (ref 10–291)
GFR, ESTIMATED: 50 ML/MIN/1.73 SQ.M.
GLOBULIN: 3.2 G/DL (ref 2–4)
GLUCOSE: 99 MG/DL (ref 70–99)
HCT VFR BLD CALC: 35.3 % (ref 35.1–48)
HEMOGLOBIN: 10.1 G/DL (ref 11.7–16)
IRON % SATURATION: 9 % (ref 20–50)
IRON: 22 MCG/DL (ref 30–160)
LYMPHOCYTES # BLD: 24 % (ref 20–45)
LYMPHOCYTES ABSOLUTE: 2.4 K/UL (ref 1–4.8)
MCH RBC QN AUTO: 25 PG (ref 26–34)
MCHC RBC AUTO-ENTMCNC: 29 G/DL (ref 31–36)
MCV RBC AUTO: 88 FL (ref 80–99)
MONOCYTES ABSOLUTE: 0.5 K/UL (ref 0.1–1)
MONOCYTES: 5 % (ref 3–12)
NEUTROPHILS ABSOLUTE: 6.6 K/UL (ref 1.8–7.7)
NEUTROPHILS SEGMENTED: 67 % (ref 40–75)
PDW BLD-RTO: 14.9 % (ref 10–15.5)
PLATELET # BLD: 270 K/UL (ref 140–440)
PMV BLD AUTO: 10.6 FL (ref 9–13)
POTASSIUM SERPL-SCNC: 5.3 MMOL/L (ref 3.5–5.5)
PROTEIN/CREAT RATIO: 3.67 MG/MG CREAT
RBC # BLD: 4 M/UL (ref 3.8–5.2)
SENTARA SPECIMEN COLLECTION: NORMAL
SODIUM BLD-SCNC: 143 MMOL/L (ref 133–145)
TOTAL IRON BINDING CAPACITY: 243 MCG/DL (ref 228–428)
TOTAL PROTEIN: 6.7 G/DL (ref 6.4–8.3)
UNSATURATED IRON BINDING CAPACITY: 221 MCG/DL (ref 110–370)
URINE TOTAL PROTEIN: 275 MG/DL
WBC # BLD: 9.9 K/UL (ref 4–11)

## 2025-05-22 ENCOUNTER — RESULTS FOLLOW-UP (OUTPATIENT)
Dept: FAMILY MEDICINE CLINIC | Facility: CLINIC | Age: 45
End: 2025-05-22

## 2025-05-22 DIAGNOSIS — D50.8 OTHER IRON DEFICIENCY ANEMIA: Primary | ICD-10-CM

## 2025-06-09 DIAGNOSIS — I10 PRIMARY HYPERTENSION: ICD-10-CM

## 2025-06-09 RX ORDER — AMLODIPINE BESYLATE 10 MG/1
10 TABLET ORAL DAILY
Qty: 90 TABLET | Refills: 3 | Status: SHIPPED | OUTPATIENT
Start: 2025-06-09

## 2025-07-14 ENCOUNTER — TELEPHONE (OUTPATIENT)
Dept: FAMILY MEDICINE CLINIC | Facility: CLINIC | Age: 45
End: 2025-07-14

## 2025-07-15 DIAGNOSIS — E11.29 TYPE 2 DIABETES MELLITUS WITH OTHER DIABETIC KIDNEY COMPLICATION, WITHOUT LONG-TERM CURRENT USE OF INSULIN (HCC): Primary | ICD-10-CM

## 2025-08-20 ENCOUNTER — OFFICE VISIT (OUTPATIENT)
Dept: FAMILY MEDICINE CLINIC | Facility: CLINIC | Age: 45
End: 2025-08-20
Payer: COMMERCIAL

## 2025-08-20 DIAGNOSIS — I10 PRIMARY HYPERTENSION: ICD-10-CM

## 2025-08-20 DIAGNOSIS — K90.0 CELIAC DISEASE: ICD-10-CM

## 2025-08-20 DIAGNOSIS — E11.29 TYPE 2 DIABETES MELLITUS WITH OTHER DIABETIC KIDNEY COMPLICATION, WITHOUT LONG-TERM CURRENT USE OF INSULIN (HCC): Primary | ICD-10-CM

## 2025-08-20 PROCEDURE — 99213 OFFICE O/P EST LOW 20 MIN: CPT | Performed by: STUDENT IN AN ORGANIZED HEALTH CARE EDUCATION/TRAINING PROGRAM

## 2025-08-20 ASSESSMENT — ENCOUNTER SYMPTOMS
NAUSEA: 0
EYE PAIN: 0
SORE THROAT: 0
RHINORRHEA: 0
CONSTIPATION: 0
DIARRHEA: 0
SHORTNESS OF BREATH: 0
CHEST TIGHTNESS: 0
ABDOMINAL PAIN: 0
VOMITING: 0
COUGH: 0
BACK PAIN: 0